# Patient Record
Sex: FEMALE
[De-identification: names, ages, dates, MRNs, and addresses within clinical notes are randomized per-mention and may not be internally consistent; named-entity substitution may affect disease eponyms.]

---

## 2017-04-17 ENCOUNTER — APPOINTMENT (OUTPATIENT)
Dept: ORTHOPEDIC SURGERY | Facility: CLINIC | Age: 42
End: 2017-04-17

## 2017-04-17 VITALS — HEIGHT: 63 IN | BODY MASS INDEX: 31.01 KG/M2 | WEIGHT: 175 LBS

## 2017-04-17 VITALS — DIASTOLIC BLOOD PRESSURE: 82 MMHG | HEART RATE: 74 BPM | SYSTOLIC BLOOD PRESSURE: 126 MMHG | TEMPERATURE: 98.9 F

## 2017-04-17 DIAGNOSIS — Z87.09 PERSONAL HISTORY OF OTHER DISEASES OF THE RESPIRATORY SYSTEM: ICD-10-CM

## 2017-04-17 DIAGNOSIS — M25.561 PAIN IN RIGHT KNEE: ICD-10-CM

## 2017-04-17 DIAGNOSIS — Z87.891 PERSONAL HISTORY OF NICOTINE DEPENDENCE: ICD-10-CM

## 2017-04-17 DIAGNOSIS — Z82.62 FAMILY HISTORY OF OSTEOPOROSIS: ICD-10-CM

## 2017-04-17 DIAGNOSIS — Z87.39 PERSONAL HISTORY OF OTHER DISEASES OF THE MUSCULOSKELETAL SYSTEM AND CONNECTIVE TISSUE: ICD-10-CM

## 2017-04-17 DIAGNOSIS — M25.562 PAIN IN RIGHT KNEE: ICD-10-CM

## 2017-06-07 ENCOUNTER — OTHER (OUTPATIENT)
Age: 42
End: 2017-06-07

## 2017-06-07 DIAGNOSIS — M25.561 PAIN IN RIGHT KNEE: ICD-10-CM

## 2017-06-16 ENCOUNTER — OTHER (OUTPATIENT)
Age: 42
End: 2017-06-16

## 2017-06-16 ENCOUNTER — APPOINTMENT (OUTPATIENT)
Dept: MRI IMAGING | Facility: CLINIC | Age: 42
End: 2017-06-16

## 2017-06-16 ENCOUNTER — OUTPATIENT (OUTPATIENT)
Dept: OUTPATIENT SERVICES | Facility: HOSPITAL | Age: 42
LOS: 1 days | End: 2017-06-16

## 2017-06-16 DIAGNOSIS — M25.561 PAIN IN RIGHT KNEE: ICD-10-CM

## 2017-06-23 ENCOUNTER — OUTPATIENT (OUTPATIENT)
Dept: OUTPATIENT SERVICES | Facility: HOSPITAL | Age: 42
LOS: 1 days | End: 2017-06-23

## 2017-06-23 ENCOUNTER — APPOINTMENT (OUTPATIENT)
Dept: MRI IMAGING | Facility: CLINIC | Age: 42
End: 2017-06-23

## 2017-06-23 DIAGNOSIS — Z00.8 ENCOUNTER FOR OTHER GENERAL EXAMINATION: ICD-10-CM

## 2017-07-14 ENCOUNTER — APPOINTMENT (OUTPATIENT)
Dept: ORTHOPEDIC SURGERY | Facility: CLINIC | Age: 42
End: 2017-07-14

## 2017-07-14 VITALS
HEIGHT: 63 IN | SYSTOLIC BLOOD PRESSURE: 121 MMHG | WEIGHT: 175 LBS | TEMPERATURE: 97.9 F | BODY MASS INDEX: 31.01 KG/M2 | HEART RATE: 96 BPM | DIASTOLIC BLOOD PRESSURE: 87 MMHG

## 2017-07-14 RX ORDER — MELOXICAM 15 MG/1
15 TABLET ORAL DAILY
Qty: 30 | Refills: 1 | Status: ACTIVE | COMMUNITY
Start: 2017-07-14 | End: 1900-01-01

## 2017-09-06 ENCOUNTER — OTHER (OUTPATIENT)
Age: 42
End: 2017-09-06

## 2017-12-28 ENCOUNTER — OTHER (OUTPATIENT)
Age: 42
End: 2017-12-28

## 2018-08-13 ENCOUNTER — APPOINTMENT (OUTPATIENT)
Dept: ORTHOPEDIC SURGERY | Facility: CLINIC | Age: 43
End: 2018-08-13
Payer: COMMERCIAL

## 2018-08-13 VITALS
BODY MASS INDEX: 31.89 KG/M2 | DIASTOLIC BLOOD PRESSURE: 79 MMHG | TEMPERATURE: 99 F | SYSTOLIC BLOOD PRESSURE: 131 MMHG | WEIGHT: 180 LBS | HEART RATE: 77 BPM | HEIGHT: 63 IN

## 2018-08-13 PROCEDURE — 99213 OFFICE O/P EST LOW 20 MIN: CPT

## 2019-04-17 ENCOUNTER — APPOINTMENT (OUTPATIENT)
Dept: ORTHOPEDIC SURGERY | Facility: CLINIC | Age: 44
End: 2019-04-17
Payer: COMMERCIAL

## 2019-04-17 VITALS
HEIGHT: 63 IN | DIASTOLIC BLOOD PRESSURE: 88 MMHG | HEART RATE: 82 BPM | BODY MASS INDEX: 31.89 KG/M2 | SYSTOLIC BLOOD PRESSURE: 122 MMHG | WEIGHT: 180 LBS

## 2019-04-17 DIAGNOSIS — M22.41 CHONDROMALACIA PATELLAE, RIGHT KNEE: ICD-10-CM

## 2019-04-17 DIAGNOSIS — M22.42 CHONDROMALACIA PATELLAE, RIGHT KNEE: ICD-10-CM

## 2019-04-17 DIAGNOSIS — S83.282D OTHER TEAR OF LATERAL MENISCUS, CURRENT INJURY, LEFT KNEE, SUBSEQUENT ENCOUNTER: ICD-10-CM

## 2019-04-17 PROCEDURE — 99214 OFFICE O/P EST MOD 30 MIN: CPT

## 2019-04-17 NOTE — DISCUSSION/SUMMARY
[Surgical risks reviewed] : Surgical risks reviewed [de-identified] : 43 year old female with patella chondromalacia bilaterally. The patient does have early degenerative medial and lateral meniscus tears in the left knee.\par \par At this point I encouraged her continue with conservative treatment, including physical therapy and activity modification. I provided her with a prescription for outpatient physical therapy. She can also continue with her gym exercises as tolerated. \par \par Given her history of left knee meniscal tear and pain that has persisted despite NSAIDs and physical therapy, I ordered an updated MRI of the left knee to evaluate internal derangement, rule-out further meniscus tearing\par \par The patient will follow-up to discuss MRI results when they are available. \par \par The percentages of success in an arthroscopy that involves a torn meniscus and arthritic changes is dependent upon how bad the arthritic changes are. Basically, removing a meniscal tear allows us to ascertain how bad the patient's articular cartilage destruction (arthritis) is. The arthroscopy cleans out any debris from the arthritic process as well as removing the meniscal tear. Approximately 75% of the patients will say that they feel relief, although their x-rays will continue to show significant arthritic changes. Arthroscopy for arthritis is a temporizing procedure, yielding subjective success (patient satisfaction) for less than two to five years. In some cases, the knee might eventually require a knee replacement for symptomatic relief. The prognostic factors that are somewhat favorable predictive values in arthroscopic debridements (removal of loose articular cartilage, loose body and inflamed synovium) of an arthritic knee are: short duration of symptoms, effusion (swelling), minimal deformity and good range of motion. The complications with any arthroscopy include the risk of anaesthetic complications and death, blood clots and pulmonary embolus, infection (less than 1%), nerve damage, by which we would mean a peroneal palsy (less than 0.1%) (small area of skin numbness is so common, we do not consider its presence a complication), injury to the popliteal artery, which is so rare that there are no statistics, but should it occur could theoretically lead to amputation, which is extremely unlikely. There is often a chance of getting a hemarthrosis (blood in the joint) but this usually resolves with local measures of icing, physical therapy, and aspiration. Reflex sympathetic dystrophy (RSD) is another extremely rare but theoretical complication. This (RSD) means that the patient has a stiff painful joint that is out of proportion to the objective pathology of the knee. Subsequently, it might require years of physical therapy before one regains a functional knee with RSD. Infrapatellar contracture syndrome (stiff joint) is sometimes reported and associated with RSD, but it usually is a result of not being aggressive in physical therapy. I think the patient understands the risk benefit ratio of arthroscopy and will think about whether they would prefer the nonoperative or surgical treatment option.\par  \par

## 2019-04-17 NOTE — HISTORY OF PRESENT ILLNESS
[Standing] : standing [NSAIDs] : relieved by nonsteroidal anti-inflammatory drugs [Intermit.] : ~He/She~ states the symptoms seem to be intermittent [Recumbency] : relieved by recumbency [Rest] : relieved by rest [4] : a current pain level of 4/10 [2] : a minimum pain level of 2/10 [Knee Flexion] : worsened with knee flexion [5] : a maximum pain level of 5/10 [Worsening] : worsening [___ yrs] : [unfilled] year(s) ago [Bending] : worsened by bending [Walking] : worsened by walking [de-identified] : 43 year old female presents with a diagnosis of early patellofemoral osteoarthritis of both knees, left knee worse than right. She does have a degenerative horizontal meniscus tear in the left knee of both the medial and lateral menisci. Her pain has persisted for about 1 year. She notes a recent had flare up of knee pain after using step ladder. Her pain has been worsening. She has attempted physical therapy in the past with good relief, however she stopped due to insurance issues. Her pain has also persisted following physical therapy. She does have persistent pain localized to the anterior aspect of her knees. She has been taking occasional anti-inflammatories, with mild relief. \par She is also struggling with plantar fasciitis and she has new orthotics which she feels may be worsening her knee pain. \par

## 2019-04-17 NOTE — PHYSICAL EXAM
[Normal] : Gait: normal [LE] : Sensory: Intact in bilateral lower extremities [ALL] : dorsalis pedis, posterior tibial, femoral, popliteal, and radial 2+ and symmetric bilaterally [de-identified] : GENERAL APPEARANCE: Well nourished and hydrated, pleasant, alert, and oriented x 3. Appears their stated age. \par HEENT: Normocephalic, extraocular eye motion intact. Nasal septum midline. Oral cavity clear. External auditory canal clear. \par RESPIRATORY: Breath sounds clear and audible in all lobes. No wheezing, No accessory muscle use.\par CARDIOVASCULAR: No apparent abnormalities. No lower leg edema. No varicosities. Pedal pulses are palpable.\par NEUROLOGIC: Sensation is normal, no muscle weakness in the upper or lower extremities.\par DERMATOLOGIC: No apparent skin lesions, moist, warm, no rash.\par SPINE: Cervical spine appears normal and moves freely; thoracic spine appears normal and moves freely; lumbosacral spine appears normal and moves freely, normal, nontender.\par MUSCULOSKELETAL: Hands, wrists, and elbows are normal and move freely, shoulders are normal and move freely.\par  [de-identified] : Both knees were examined full range of motion no effusion she does have moderate patellofemoral crepitus bilaterally, left knee mild medial and lateral jointline tenderness.\par Bilateral hip exam shows painless FROM.\par

## 2019-04-17 NOTE — ADDENDUM
[FreeTextEntry1] : I, Keith Orlando, acted solely as a scribe for Dr. Herb Blanco on this date 04/17/2019.

## 2019-04-22 ENCOUNTER — FORM ENCOUNTER (OUTPATIENT)
Age: 44
End: 2019-04-22

## 2019-04-23 ENCOUNTER — OUTPATIENT (OUTPATIENT)
Dept: OUTPATIENT SERVICES | Facility: HOSPITAL | Age: 44
LOS: 1 days | End: 2019-04-23

## 2019-04-23 ENCOUNTER — APPOINTMENT (OUTPATIENT)
Dept: MRI IMAGING | Facility: CLINIC | Age: 44
End: 2019-04-23
Payer: COMMERCIAL

## 2019-04-23 DIAGNOSIS — M22.41 CHONDROMALACIA PATELLAE, RIGHT KNEE: ICD-10-CM

## 2019-04-23 PROCEDURE — 73721 MRI JNT OF LWR EXTRE W/O DYE: CPT | Mod: 26,LT

## 2019-08-28 ENCOUNTER — APPOINTMENT (OUTPATIENT)
Dept: ORTHOPEDIC SURGERY | Facility: CLINIC | Age: 44
End: 2019-08-28
Payer: COMMERCIAL

## 2019-08-28 VITALS
HEART RATE: 80 BPM | SYSTOLIC BLOOD PRESSURE: 131 MMHG | BODY MASS INDEX: 30.12 KG/M2 | DIASTOLIC BLOOD PRESSURE: 63 MMHG | WEIGHT: 170 LBS | HEIGHT: 63 IN

## 2019-08-28 DIAGNOSIS — M17.0 BILATERAL PRIMARY OSTEOARTHRITIS OF KNEE: ICD-10-CM

## 2019-08-28 DIAGNOSIS — S83.242D OTHER TEAR OF MEDIAL MENISCUS, CURRENT INJURY, LEFT KNEE, SUBSEQUENT ENCOUNTER: ICD-10-CM

## 2019-08-28 PROCEDURE — 99213 OFFICE O/P EST LOW 20 MIN: CPT

## 2019-08-28 NOTE — PHYSICAL EXAM
[Normal] : Gait: normal [LE] : 5/5 motor strength in bilateral lower extremities [ALL] : dorsalis pedis, posterior tibial, femoral, popliteal, and radial 2+ and symmetric bilaterally [Antalgic] : not antalgic [de-identified] : GENERAL APPEARANCE: Well nourished and hydrated, pleasant, alert, and oriented x 3. Appears their stated age. \par HEENT: Normocephalic, extraocular eye motion intact. Nasal septum midline. Oral cavity clear. External auditory canal clear. \par RESPIRATORY: Breath sounds clear and audible in all lobes. No wheezing, No accessory muscle use.\par CARDIOVASCULAR: No apparent abnormalities. No lower leg edema. No varicosities. Pedal pulses are palpable.\par NEUROLOGIC: Sensation is normal, no muscle weakness in the upper or lower extremities.\par DERMATOLOGIC: No apparent skin lesions, moist, warm, no rash.\par SPINE: Cervical spine appears normal and moves freely; thoracic spine appears normal and moves freely; lumbosacral spine appears normal and moves freely, normal, nontender.\par MUSCULOSKELETAL: Hands, wrists, and elbows are normal and move freely, shoulders are normal and move freely.\par  [de-identified] : Both knees were examined and she has full range of motion, no effusion, moderate patellofemoral crepitus bilaterally, left knee mild medial jointline tenderness.\par Bilateral hip exam shows painless FROM.\par  [de-identified] : MRI of the left knee obtained 4/23/2019 at [facility] shows moderate patellofemoral arthrosis as on prior study. Slight worsening of horizontal cleavage tear involving the boy and anterior horn of the medial meniscus with an adjacent new parameniscal cyst. High origin of the anterior tibial artery.

## 2019-08-28 NOTE — HISTORY OF PRESENT ILLNESS
[___ yrs] : [unfilled] year(s) ago [4] : a current pain level of 4/10 [2] : an average pain level of 2/10 [5] : a maximum pain level of 5/10 [Standing] : standing [Intermit.] : ~He/She~ states the symptoms seem to be intermittent [Bending] : worsened by bending [Walking] : worsened by walking [Knee Flexion] : worsened with knee flexion [NSAIDs] : relieved by nonsteroidal anti-inflammatory drugs [Recumbency] : relieved by recumbency [Rest] : relieved by rest [Stable] : stable [Physical Therapy] : relieved by physical therapy [de-identified] : 43 year old female here for evaluation of bilateral knee pain, left worse than right. She has hx of PF OA. Patient was sent for PT and updated MRI at last visit. She states she is in PT currently.  She reports feeling relief with PT. She did have one episode of popping sensation with pain during squatting motion. She reports pain is localized medially. Pain is worse with bending motions and ambulating.\par

## 2019-08-28 NOTE — DISCUSSION/SUMMARY
[Surgical risks reviewed] : Surgical risks reviewed [de-identified] : 43 year old female with mild patellofemoral osteoarthritis of the bilateral knees and medial meniscus tear of the left knee. I reviewed MRI results with the patient which confirmed left knee patellofemoral OA and showed worsening of her medial meniscus tear. She is a candidate for left knee arthroscopy, but I recommended that she continue with non-operative treatment at this time and she agrees. We did discuss the role of knee arthroscopy should she decide to pursue surgery in the future. She has responded well to physical therapy, and I provided her with a new prescription for outpatient PT for her bilateral knees. Activity modifications and restrictions were discussed. The importance and benefits of weight loss was discussed. She deferred knee cortisone injection today. F/U 3 months. \par \par The percentages of success in an arthroscopy that involves a torn meniscus and arthritic changes is dependent upon how bad the arthritic changes are. Basically, removing a meniscal tear allows us to ascertain how bad the patient's articular cartilage destruction (arthritis) is. The arthroscopy cleans out any debris from the arthritic process as well as removing the meniscal tear. Approximately 75% of the patients will say that they feel relief, although their x-rays will continue to show significant arthritic changes. Arthroscopy for arthritis is a temporizing procedure, yielding subjective success (patient satisfaction) for less than two to five years. In some cases, the knee might eventually require a knee replacement for symptomatic relief. The prognostic factors that are somewhat favorable predictive values in arthroscopic debridements (removal of loose articular cartilage, loose body and inflamed synovium) of an arthritic knee are: short duration of symptoms, effusion (swelling), minimal deformity and good range of motion. The complications with any arthroscopy include the risk of anaesthetic complications and death, blood clots and pulmonary embolus, infection (less than 1%), nerve damage, by which we would mean a peroneal palsy (less than 0.1%) (small area of skin numbness is so common, we do not consider its presence a complication), injury to the popliteal artery, which is so rare that there are no statistics, but should it occur could theoretically lead to amputation, which is extremely unlikely. There is often a chance of getting a hemarthrosis (blood in the joint) but this usually resolves with local measures of icing, physical therapy, and aspiration. Reflex sympathetic dystrophy (RSD) is another extremely rare but theoretical complication. This (RSD) means that the patient has a stiff painful joint that is out of proportion to the objective pathology of the knee. Subsequently, it might require years of physical therapy before one regains a functional knee with RSD. Infrapatellar contracture syndrome (stiff joint) is sometimes reported and associated with RSD, but it usually is a result of not being aggressive in physical therapy. I think the patient understands the risk benefit ratio of arthroscopy and will think about whether they would prefer the nonoperative or surgical treatment option. \par \par \par  \par  \par

## 2019-08-28 NOTE — ADDENDUM
[FreeTextEntry1] : I, Keith Orlando, acted solely as a scribe for Dr. Herb Blanco on this date 08/28/2019.

## 2023-06-13 ENCOUNTER — APPOINTMENT (OUTPATIENT)
Dept: ORTHOPEDIC SURGERY | Facility: CLINIC | Age: 48
End: 2023-06-13
Payer: COMMERCIAL

## 2023-06-13 VITALS
WEIGHT: 165 LBS | HEIGHT: 63 IN | OXYGEN SATURATION: 100 % | SYSTOLIC BLOOD PRESSURE: 116 MMHG | DIASTOLIC BLOOD PRESSURE: 80 MMHG | BODY MASS INDEX: 29.23 KG/M2 | HEART RATE: 76 BPM

## 2023-06-13 PROCEDURE — 73564 X-RAY EXAM KNEE 4 OR MORE: CPT | Mod: 50

## 2023-06-13 PROCEDURE — 99204 OFFICE O/P NEW MOD 45 MIN: CPT

## 2023-06-13 RX ORDER — MELOXICAM 7.5 MG/1
7.5 TABLET ORAL
Qty: 30 | Refills: 0 | Status: ACTIVE | COMMUNITY
Start: 2023-06-13 | End: 1900-01-01

## 2023-06-13 NOTE — HISTORY OF PRESENT ILLNESS
[de-identified] : Patient is a 47-year-old female presenting for evaluation of bilateral knee pain equal intensity.  She notes the knee pain is generalized mostly anteriorly and medially.  Her pain is worse with stairs and with rising from a seated position.  She admits to intermittent crepitus.  Patient denies any falls or trauma.  In the past she was diagnosed with left knee medial meniscus tear.  This was treated conservatively years ago.  She has not had a recent MRI.  She has not had recent injections.  Patient tried therapy and anti-inflammatories without significant relief

## 2023-06-13 NOTE — DISCUSSION/SUMMARY
[de-identified] : SURAJ GARCIA is a 47 year old female who presents with bilateral knee early patellofemoral compartment arthritis. Nonoperative treatment options for knee arthritis were discussed. A prescription for physical therapy was provided. A course of Mobic was recommended. The patient was given a prescription for the Mobic with directions. She was instructed to stop the medicine and call the office if there are any adverse reaction to the medicine. The patient will follow up if her pain worsens at which point we will consider cortisone or gel injections in her knees.  We discussed viscosupplementation hyaluronic acid injections and their use but I do think she may ultimately benefit from.

## 2023-06-13 NOTE — PHYSICAL EXAM
[de-identified] : The patient appears well nourished  and in no apparent distress.  The patient is alert and oriented to person, place, and time.   Affect and mood appear normal. The head is normocephalic and atraumatic.  The eyes reveal normal sclera and extra ocular muscles are intact. The tongue is midline with no apparent lesions.  Skin shows normal turgor with no evidence of eczema or psoriasis.  No respiratory distress noted.  Sensation grossly intact.		  [de-identified] : Exam of the right knee shows 0 to 140 degrees of flexion measured with a goniometer. There is no effusion. There is patellofemoral crepitance without pain. There is no joint line tenderness. \par Exam of the left knee shows 0 to 140 degrees of flexion measured with a goniometer. There is no effusion. There is patellofemoral crepitance (more than in the right knee) without pain. There is no joint line tenderness. \par 5/5 motor strength bilaterally distally. Sensation intact distally.  [de-identified] : X-ray: 4 views of the left knee demonstrate well preserved joint spaces with small patellofemoral osteophytes. 		 \par X-ray: 4 views of the right knee demonstrate well preserved joint spaces with small patellofemoral osteophytes.

## 2023-06-13 NOTE — ADDENDUM
[FreeTextEntry1] : This note was authored by Antonio Youngblood working as a medical scribe for Dr. Mustapha Chang. The note was reviewed, edited, and revised by Dr. Mustapha Chang whom is in agreement with the exam findings, imaging findings, and treatment plan. 06/13/2023

## 2024-07-09 ENCOUNTER — INPATIENT (INPATIENT)
Facility: HOSPITAL | Age: 49
LOS: 2 days | Discharge: HOME | DRG: 103 | End: 2024-07-12
Attending: INTERNAL MEDICINE | Admitting: STUDENT IN AN ORGANIZED HEALTH CARE EDUCATION/TRAINING PROGRAM
Payer: COMMERCIAL

## 2024-07-09 VITALS
OXYGEN SATURATION: 97 % | RESPIRATION RATE: 20 BRPM | WEIGHT: 160.94 LBS | DIASTOLIC BLOOD PRESSURE: 77 MMHG | SYSTOLIC BLOOD PRESSURE: 128 MMHG | HEART RATE: 94 BPM | TEMPERATURE: 98 F

## 2024-07-09 LAB
ALBUMIN SERPL ELPH-MCNC: 3.7 G/DL — SIGNIFICANT CHANGE UP (ref 3.3–5.2)
ALP SERPL-CCNC: 58 U/L — SIGNIFICANT CHANGE UP (ref 40–120)
ALT FLD-CCNC: 19 U/L — SIGNIFICANT CHANGE UP
ANION GAP SERPL CALC-SCNC: 15 MMOL/L — SIGNIFICANT CHANGE UP (ref 5–17)
AST SERPL-CCNC: 29 U/L — SIGNIFICANT CHANGE UP
BASOPHILS # BLD AUTO: 0.04 K/UL — SIGNIFICANT CHANGE UP (ref 0–0.2)
BASOPHILS NFR BLD AUTO: 0.5 % — SIGNIFICANT CHANGE UP (ref 0–2)
BILIRUB SERPL-MCNC: 0.3 MG/DL — LOW (ref 0.4–2)
BUN SERPL-MCNC: 18.5 MG/DL — SIGNIFICANT CHANGE UP (ref 8–20)
CALCIUM SERPL-MCNC: 8.9 MG/DL — SIGNIFICANT CHANGE UP (ref 8.4–10.5)
CHLORIDE SERPL-SCNC: 99 MMOL/L — SIGNIFICANT CHANGE UP (ref 96–108)
CO2 SERPL-SCNC: 23 MMOL/L — SIGNIFICANT CHANGE UP (ref 22–29)
CREAT SERPL-MCNC: 0.67 MG/DL — SIGNIFICANT CHANGE UP (ref 0.5–1.3)
CRP SERPL-MCNC: <4 MG/L — SIGNIFICANT CHANGE UP
EGFR: 106 ML/MIN/1.73M2 — SIGNIFICANT CHANGE UP
EOSINOPHIL # BLD AUTO: 0.1 K/UL — SIGNIFICANT CHANGE UP (ref 0–0.5)
EOSINOPHIL NFR BLD AUTO: 1.2 % — SIGNIFICANT CHANGE UP (ref 0–6)
ERYTHROCYTE [SEDIMENTATION RATE] IN BLOOD: 5 MM/HR — SIGNIFICANT CHANGE UP (ref 0–20)
GLUCOSE SERPL-MCNC: 112 MG/DL — HIGH (ref 70–99)
HCT VFR BLD CALC: 42.3 % — SIGNIFICANT CHANGE UP (ref 34.5–45)
HGB BLD-MCNC: 14.2 G/DL — SIGNIFICANT CHANGE UP (ref 11.5–15.5)
IMM GRANULOCYTES NFR BLD AUTO: 0.2 % — SIGNIFICANT CHANGE UP (ref 0–0.9)
LYMPHOCYTES # BLD AUTO: 1.52 K/UL — SIGNIFICANT CHANGE UP (ref 1–3.3)
LYMPHOCYTES # BLD AUTO: 18.7 % — SIGNIFICANT CHANGE UP (ref 13–44)
MCHC RBC-ENTMCNC: 29.7 PG — SIGNIFICANT CHANGE UP (ref 27–34)
MCHC RBC-ENTMCNC: 33.6 GM/DL — SIGNIFICANT CHANGE UP (ref 32–36)
MCV RBC AUTO: 88.5 FL — SIGNIFICANT CHANGE UP (ref 80–100)
MONOCYTES # BLD AUTO: 0.87 K/UL — SIGNIFICANT CHANGE UP (ref 0–0.9)
MONOCYTES NFR BLD AUTO: 10.7 % — SIGNIFICANT CHANGE UP (ref 2–14)
NEUTROPHILS # BLD AUTO: 5.58 K/UL — SIGNIFICANT CHANGE UP (ref 1.8–7.4)
NEUTROPHILS NFR BLD AUTO: 68.7 % — SIGNIFICANT CHANGE UP (ref 43–77)
PLATELET # BLD AUTO: 204 K/UL — SIGNIFICANT CHANGE UP (ref 150–400)
POTASSIUM SERPL-MCNC: 4 MMOL/L — SIGNIFICANT CHANGE UP (ref 3.5–5.3)
POTASSIUM SERPL-SCNC: 4 MMOL/L — SIGNIFICANT CHANGE UP (ref 3.5–5.3)
PROT SERPL-MCNC: 6.7 G/DL — SIGNIFICANT CHANGE UP (ref 6.6–8.7)
RBC # BLD: 4.78 M/UL — SIGNIFICANT CHANGE UP (ref 3.8–5.2)
RBC # FLD: 12.1 % — SIGNIFICANT CHANGE UP (ref 10.3–14.5)
SODIUM SERPL-SCNC: 137 MMOL/L — SIGNIFICANT CHANGE UP (ref 135–145)
WBC # BLD: 8.13 K/UL — SIGNIFICANT CHANGE UP (ref 3.8–10.5)
WBC # FLD AUTO: 8.13 K/UL — SIGNIFICANT CHANGE UP (ref 3.8–10.5)

## 2024-07-09 PROCEDURE — 99285 EMERGENCY DEPT VISIT HI MDM: CPT

## 2024-07-09 RX ORDER — METOCLOPRAMIDE 5 MG/5ML
10 SOLUTION ORAL ONCE
Refills: 0 | Status: COMPLETED | OUTPATIENT
Start: 2024-07-09 | End: 2024-07-09

## 2024-07-09 RX ORDER — SODIUM CHLORIDE 0.9 % (FLUSH) 0.9 %
1000 SYRINGE (ML) INJECTION ONCE
Refills: 0 | Status: COMPLETED | OUTPATIENT
Start: 2024-07-09 | End: 2024-07-09

## 2024-07-09 RX ADMIN — METOCLOPRAMIDE 10 MILLIGRAM(S): 5 SOLUTION ORAL at 21:36

## 2024-07-09 RX ADMIN — Medication 1000 MILLILITER(S): at 21:36

## 2024-07-09 NOTE — ED ADULT NURSE NOTE - OBJECTIVE STATEMENT
Pt A&Ox4. Came in w/ c/o left eye pain & discomfort as well as associated headache since June 29th. Pt seen at opthalmologist today and sent in for further workup. Denies visual disturbances, chest pain, SOB, N/V, numbness, weakness, tingling. VS stable, RR even and unlabored, safety maintained.

## 2024-07-09 NOTE — ED PROVIDER NOTE - PHYSICAL EXAMINATION
Gen: Alert, NAD  Head: NC, AT, PERRL, EOMI, normal lids/conjunctiva. ttp @ left temporal forehead  Neck: +supple, no tenderness/meningismus/JVD, +Trachea midline  Pulm: Bilateral BS, normal resp effort, no wheeze/stridor/retractions  CV: RRR, no M/R/G, 2+dist pulses  Abd: soft, NT/ND, +BS, no hepatosplenomegaly  Mskel: ROM intact x4 extremities.  no edema/erythema/cyanosis  Neuro: AAOx3, no sensory/motor deficits, CN 2-12 intact

## 2024-07-09 NOTE — ED ADULT NURSE NOTE - CHPI ED NUR SYMPTOMS NEG
no blurred vision/no discharge/no double vision/no drainage/no eye lid swelling/no foreign body/no purulent drainage

## 2024-07-09 NOTE — ED PROVIDER NOTE - CLINICAL SUMMARY MEDICAL DECISION MAKING FREE TEXT BOX
48yoF; with PMH signif for ?Ocular Migraines; now p/w left eye pain--x3-4 days. pressure to eye, radiating to left sided headache. (FABIOLA Urrutia MD) Initial Assessment: 48yoF; with PMH signif for ?Ocular Migraines; now p/w left eye pain--x3-4 days. pressure to eye, radiating to left sided headache.  will do labs, ct/cta, re-eval      ACP to complete summary of medical encounter below.  Summary of Clinical Encounter: (FABIOLA Urrutia MD) Initial Assessment: 48yoF; with PMH signif for ?Ocular Migraines; now p/w left eye pain--x3-4 days. pressure to eye, radiating to left sided headache.  will do labs, ct/cta, re-eval      ACP to complete summary of medical encounter below.  Summary of Clinical Encounter:  [facility] : Labs unremarkable. CT/CTA no acute findings. HA somewhat improved however still present. Was sent in by ophtho concerned for MS advising MRI. Pt agreeable to stay on observation for MRI.

## 2024-07-09 NOTE — ED PROVIDER NOTE - NS ED ROS FT
Constitutional: (-) fever  (-)chills  (-)sweats  Eyes/ENT: +eye pain  Cardiovascular: (-) chest pain, (-) palpitations (-) edema   Respiratory: (-) cough, (-) shortness of breath   Gastrointestinal: (-)nausea  (-)vomiting, (-) diarrhea  (-) abdominal pain   :  (-)dysuria, (-)frequency, (-)urgency, (-)hematuria  Musculoskeletal: (-) neck pain, (-) back pain, (-) joint pain  Integumentary: (-) rash, (-) edema  Neurological: (+) headache, (-) altered mental status  (-)LOC

## 2024-07-09 NOTE — ED PROVIDER NOTE - OBJECTIVE STATEMENT
48yoF; with PMH signif for ?Ocular Migraines; now p/w left eye pain--x3-4 days. pressure to eye, radiating to left sided headache. denies blurry vision or double vision. denies numbness/tingling. denies clumsiness or unsteady gait. does report some decreased  strength in right hand x1 month.  denies cp/sob/palp. denies dizziness.   Family History: systemic vasculitis (mother), MS (grandmother)  PMH: denies  SOCIAL: denies smoking 48yoF; with PMH signif for ?Ocular Migraines; now p/w left eye pain--x3-4 days. pressure to eye, radiating to left sided headache. denies blurry vision or double vision. denies numbness/tingling. denies clumsiness or unsteady gait. does report some decreased  strength in right hand x1 month.  denies cp/sob/palp. denies dizziness.   patient went to Ophthalmologist today for evaluation, found to have normal fundoscopic exam, normal retinas, normal IOP, normal visual acuity. sent by ophtho with concern of MS.   Family History: systemic vasculitis (mother), MS (grandmother)  PMH: denies  SOCIAL: denies smoking

## 2024-07-10 DIAGNOSIS — R51.9 HEADACHE, UNSPECIFIED: ICD-10-CM

## 2024-07-10 LAB
APPEARANCE CSF: CLEAR — SIGNIFICANT CHANGE UP
B BURGDOR C6 AB SER-ACNC: NEGATIVE — SIGNIFICANT CHANGE UP
B BURGDOR IGG+IGM SER-ACNC: 0.37 INDEX — SIGNIFICANT CHANGE UP (ref 0.01–0.9)
C NEOFORM RRNA SPEC NAA+PROBE-ACNC: SIGNIFICANT CHANGE UP
CMV DNA CSF QL NAA+PROBE: SIGNIFICANT CHANGE UP
COLOR CSF: SIGNIFICANT CHANGE UP
CRYPTOC AG CSF-ACNC: NEGATIVE — SIGNIFICANT CHANGE UP
CSF PCR RESULT: SIGNIFICANT CHANGE UP
E COLI K1 DNA CSF QL NAA+NON-PROBE: SIGNIFICANT CHANGE UP
ESCHERICHIA COLI K1: SIGNIFICANT CHANGE UP
EV RNA CSF QL NAA+PROBE: SIGNIFICANT CHANGE UP
GLUCOSE CSF-MCNC: 63 MG/DLG/24H — SIGNIFICANT CHANGE UP (ref 40–70)
GLUCOSE SERPL-MCNC: 113 MG/DL — HIGH (ref 70–99)
GP B STREP DNA SPEC QL NAA+PROBE: SIGNIFICANT CHANGE UP
GRAM STN FLD: SIGNIFICANT CHANGE UP
GRAM STN FLD: SIGNIFICANT CHANGE UP
HAEM INFLU DNA SPEC QL NAA+PROBE: SIGNIFICANT CHANGE UP
HHV6 DNA CSF QL NAA+PROBE: SIGNIFICANT CHANGE UP
HSV1 DNA CSF QL NAA+PROBE: SIGNIFICANT CHANGE UP
HSV2 DNA CSF QL NAA+PROBE: SIGNIFICANT CHANGE UP
L MONOCYTOG DNA SPEC QL NAA+PROBE: SIGNIFICANT CHANGE UP
N MEN DNA SPEC QL NAA+PROBE: SIGNIFICANT CHANGE UP
NEUTROPHILS # CSF: SIGNIFICANT CHANGE UP % (ref 0–6)
NRBC NFR CSF: 1 /UL — SIGNIFICANT CHANGE UP (ref 0–5)
PARECHOVIRUS A RNA SPEC QL NAA+PROBE: SIGNIFICANT CHANGE UP
PROT CSF-MCNC: 27 MG/DL — SIGNIFICANT CHANGE UP (ref 15–45)
RBC # CSF: 0 /CMM — SIGNIFICANT CHANGE UP (ref 0–1)
S PNEUM DNA SPEC QL NAA+PROBE: SIGNIFICANT CHANGE UP
SPECIMEN SOURCE: SIGNIFICANT CHANGE UP
T PALLIDUM AB TITR SER: NEGATIVE — SIGNIFICANT CHANGE UP
TUBE TYPE: SIGNIFICANT CHANGE UP
VZV DNA CSF QL NAA+PROBE: SIGNIFICANT CHANGE UP

## 2024-07-10 PROCEDURE — 99223 1ST HOSP IP/OBS HIGH 75: CPT

## 2024-07-10 PROCEDURE — 70543 MRI ORBT/FAC/NCK W/O &W/DYE: CPT | Mod: 26,MC

## 2024-07-10 PROCEDURE — 70496 CT ANGIOGRAPHY HEAD: CPT | Mod: 26,MC

## 2024-07-10 PROCEDURE — 62270 DX LMBR SPI PNXR: CPT

## 2024-07-10 PROCEDURE — 70498 CT ANGIOGRAPHY NECK: CPT | Mod: 26,MC

## 2024-07-10 PROCEDURE — 70553 MRI BRAIN STEM W/O & W/DYE: CPT | Mod: 26,MC

## 2024-07-10 PROCEDURE — 99223 1ST HOSP IP/OBS HIGH 75: CPT | Mod: 25

## 2024-07-10 PROCEDURE — 99283 EMERGENCY DEPT VISIT LOW MDM: CPT

## 2024-07-10 RX ORDER — METHYLPREDNISOLONE ACETATE 20 MG/ML
125 VIAL (ML) INJECTION ONCE
Refills: 0 | Status: COMPLETED | OUTPATIENT
Start: 2024-07-10 | End: 2024-07-10

## 2024-07-10 RX ORDER — ACETAMINOPHEN 325 MG
650 TABLET ORAL EVERY 6 HOURS
Refills: 0 | Status: DISCONTINUED | OUTPATIENT
Start: 2024-07-10 | End: 2024-07-12

## 2024-07-10 RX ORDER — METHYLPREDNISOLONE ACETATE 20 MG/ML
1000 VIAL (ML) INJECTION DAILY
Refills: 0 | Status: COMPLETED | OUTPATIENT
Start: 2024-07-10 | End: 2024-07-12

## 2024-07-10 RX ORDER — MAGNESIUM, ALUMINUM HYDROXIDE 400-400
30 TABLET,CHEWABLE ORAL EVERY 4 HOURS
Refills: 0 | Status: DISCONTINUED | OUTPATIENT
Start: 2024-07-10 | End: 2024-07-12

## 2024-07-10 RX ORDER — DIAZEPAM 10 MG/1
5 TABLET ORAL ONCE
Refills: 0 | Status: DISCONTINUED | OUTPATIENT
Start: 2024-07-10 | End: 2024-07-10

## 2024-07-10 RX ADMIN — DIAZEPAM 5 MILLIGRAM(S): 10 TABLET ORAL at 10:19

## 2024-07-10 RX ADMIN — Medication 125 MILLIGRAM(S): at 08:50

## 2024-07-10 RX ADMIN — Medication 50 MILLIGRAM(S): at 13:10

## 2024-07-10 RX ADMIN — Medication 650 MILLIGRAM(S): at 18:23

## 2024-07-10 NOTE — ED CDU PROVIDER DISPOSITION NOTE - ATTENDING CONTRIBUTION TO CARE
pt with work up for optic neuritis; Lumbar puncture performed; as per neurology pt to be admitted for iv steroids , and monitoring.

## 2024-07-10 NOTE — ED CDU PROVIDER INITIAL DAY NOTE - CLINICAL SUMMARY MEDICAL DECISION MAKING FREE TEXT BOX
48yoF; with PMH signif for ?Ocular Migraines; now p/w left eye pain--x3-4 days. pressure to eye, radiating to left sided headache. saw ophtho with normal IOP/fundoscopic exam and sent in for further work-up /possible optic neuritis. Labs unremarkable. CT/CTA no acute findings. HA somewhat improved however still present. Placed on obs for MRI

## 2024-07-10 NOTE — H&P ADULT - HISTORY OF PRESENT ILLNESS
"Request for medication refill:    Requesting BD syr/ndl 3ml 23Gx1 1-2, not on current med list    Providers if patient needs an appointment and you are willing to give a one month supply please refill for one month and  send a letter/MyChart using \".SMILLIMITEDREFILL\" .smillimited and route chart to \"P SMI \" (Giving one month refill in non controlled medications is strongly recommended before denial)    If refill has been denied, meaning absolutely no refills without visit, please complete the smart phrase \".smirxrefuse\" and route it to the \"P SMI MED REFILLS\"  pool to inform the patient and the pharmacy.    Emma Curtis, CMA        "
49 y/o F w/ PMH of occular migraines presents c/o L-eye pain x 4 days. Pt states the pain is pressure like initially and was intermittent but for the past 2 days its constant and is hurts more w/ eye movement.  Initially hurt when looking up to the left and right then progressed to pain w/ all eye movements.  She also had an associated frontal HA yesterday.  She went to ophthalmologist today and was found to have a normal fundoscopic exam, normal IOP, normal VA but was sent in to ED for concern for MS.  Pt denies vision loss or vision changes.  She has no trauma to eye.  She denies unsteady gait, incontinence dizziness, near sycnope. Pt does make note of her mother having vasculitis and her grandmother having MS.

## 2024-07-10 NOTE — PATIENT PROFILE ADULT - FALL HARM RISK - UNIVERSAL INTERVENTIONS
Bed in lowest position, wheels locked, appropriate side rails in place/Call bell, personal items and telephone in reach/Instruct patient to call for assistance before getting out of bed or chair/Non-slip footwear when patient is out of bed/[place] to call system/Physically safe environment - no spills, clutter or unnecessary equipment/Purposeful Proactive Rounding/Room/bathroom lighting operational, light cord in reach

## 2024-07-10 NOTE — H&P ADULT - NSHPLABSRESULTS_GEN_ALL_CORE
14.2   8.13  )-----------( 204      ( 09 Jul 2024 21:17 )             42.3         07-09    137  |  99  |  18.5  ----------------------------<  112<H>  4.0   |  23.0  |  0.67    Ca    8.9      09 Jul 2024 21:17    TPro  6.7  /  Alb  3.7  /  TBili  0.3<L>  /  DBili  x   /  AST  29  /  ALT  19  /  AlkPhos  58  07-09        < from: MR Orbits w/wo IV Cont (07.10.24 @ 07:27) >    IMPRESSION:    MRI ORBITS:  1.  Mild enhancement with faint corresponding increase in T2 signal of   the intraorbital segment of the left optic nerve, likely compatible with   a optic neuritis. A small optic nerve glioma is not entirely excluded but   felt less much less likely. Recommend follow-up imaging.    MRI BRAIN:  1.  No evidence of acute infarct or significant midline shift.  2.  Few foci of increased T2/FLAIR signal throughout the subcortical and   deep white matter. This finding is nonspecific and has been seen in the   setting of chronic small vessel disease, demyelination, sequela of   chronic migraine headaches, or other infectious/inflammatory processes.    --- End of Report ---    < end of copied text >    < from: CT Angio Head w/ IV Cont (07.10.24 @ 01:23) >    IMPRESSION:    CT BRAIN:  No acute intracranial hemorrhage, mass effect, or CT evidence   of an acute or recent subacute transcortical infarct.    CTA NECK:  No significant stenosis of the cervical carotid or vertebral   arteries.    CTA HEAD:  No significant stenosis or occlusion of the major proximal   branches of the [place].    --- End of Report ---      < end of copied text >

## 2024-07-10 NOTE — ED ADULT NURSE REASSESSMENT NOTE - NIH STROKE SCALE: 1B. LOC QUESTIONS, QM
I have reviewed and concur with Dr. Javed's history, physical, assessment, and plan.  I have personally interviewed and examined the patient.  See below addendum for my evaluation and additional findings.    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  Prior to surgery he had no symptoms of Cushing's syndrome.  Patient was ill with coronavirus so was unable to get ordered blood work done including low-dose dexamethasone suppression test.  He has been having fatigue, temperature intolerance, hair loss, low libido.  Symptoms are nonspecific but will check pituitary log as to rule out adrenal insufficiency (unlikely given duration of symptoms), thyroid dysfunction, hypogonadism.  Given that he had positive ACTH on pathology, in the future will have him do the low-dose dexamethasone suppression test.  He is already scheduled for repeat MRI and follow-up with Dr. Argueta to review the results.  I will see him virtually in 3 months, I will order labs based on these results for the future.    Lazarus Franz MD       
(0) Answers both questions correctly
(0) Answers both questions correctly

## 2024-07-10 NOTE — CONSULT NOTE ADULT - ASSESSMENT
The patient is a 48y Female with left optic neuritis.     Optic neuritis.   MRI findings consistent with optic neuritis.   Brain MRI findings are non specific and do not appear typical of multiple sclerosis.   Agree that LP was indicated to assess CSF for inflammation, OCB, and MBP.   Will begin IV steroid x 3 days.     Case discussed with ER team Dr Gil attending, and with Dr Finley (admitting hospitalist).

## 2024-07-10 NOTE — H&P ADULT - ASSESSMENT
47 y/o F w/ PMH of occular migraines presents c/o L-eye pain x 4 days. Pt states the pain is pressure like initially and was intermittent but for the past 2 days its constant and is hurts more w/ eye movement.  Initially hurt when looking up to the left and right then progressed to pain w/ all eye movements.  She also had an associated frontal HA yesterday.  She went to ophthalmologist today and was found to have a normal fundoscopic exam, normal IOP, normal VA but was sent in to ED for concern for MS.  Pt denies vision loss or vision changes.  She has no trauma to eye.  She denies unsteady gait, incontinence dizziness, near sycnope.  ESR/CRP negative.  CTH and CTA h/n negative.  MRI brain w/ nonspecific findings.  MRI orbit w/ findings suggestive of L-optic neuritis.  Pt was given 125mg IV solumedrol.  LP performed  Neurology consulted.  Pt will be admitted for optic neuritis.        L eye optic neuritis   - MRI brain w/ nonspecific findings that can be seen w/ chronic small vessel dx, demyelinating dx, sequela of chronic migraines or infectious/inflammatory process   - MRI orbits w/ findings suggestive of L-optic neuritis   - CTH and CTA h/n negative for acute pathology   - ESR 5 and CRP<4 making vasculitis less likely   - Clinically no signs of infection, is without meningeal signs, wbc normal and afebrile   - s/p 125mg IV solumedrol in ED   - s/p LP and CSF sent to assess for inflammation, infection, OCB, and MBP  - Start on 1g IV solumedrol x3 days   - Neurology following and recs noted        VTE ppx: ambulatory     Dispo: Acute.  d/c in 3rd day of pulse steroid regimen if medically stable.

## 2024-07-10 NOTE — H&P ADULT - NSHPPHYSICALEXAM_GEN_ALL_CORE
GENERAL: pt examined bedside, laying comfortably in bed in NAD  HEENT: NC/AT, moist oral mucosa, clear conjunctiva, sclera nonicteric  RESPIRATORY: Normal respiratory effort, no wheezing, rhonchi, rales  CARDIOVASCULAR: RRR, normal S1 and S2, no murmur/rub/gallop  ABDOMEN: soft, NT/ND, normoactive bowel sounds, no rebound/guarding  MSK: No joint deformities, edema, erythema  EXTREMITIES: No cynaosis, no clubbing, no lower extremity edema  PSYCH: affect appropriate and cooperative  NEUROLOGY: A+O to person, place, and time, no focal neurologic deficits appreciated  SKIN: No rashes or no palpable lesions

## 2024-07-10 NOTE — ED CDU PROVIDER DISPOSITION NOTE - CLINICAL COURSE
Patient with eye pain, seen by outside optho concern for MS, MRI +optic neuritis, consulted neurology advises LP.  Pending results.  Case to be discussed with hospitalist.

## 2024-07-10 NOTE — ED ADULT NURSE REASSESSMENT NOTE - NS ED NURSE REASSESS COMMENT FT1
received report from Azar taylor and assumed care of pt. pt currently at mri. will assess upon return.
pt. axo3 with equal and unlabored resp placed under obs for MRI in am, no signs of distress noted at this time.
pt. axo3 with equal and unlabored resp showing no signs of distress after receiving report from day RN.
sitting calm in bed. a and o x3. breathing even and unlabored. no complaints at this time. pt updated on poc.
received pt back from mri. sitting calm in bed. a and o x3. breathing even and unlabored. on observation and awaiting mri results.

## 2024-07-10 NOTE — PATIENT PROFILE ADULT - INTERNATIONAL TRAVEL
Prior auth for Tacey Polite was denied by insurance. Patient needs to try and fail invokana, jardiance. Recommendations? No

## 2024-07-10 NOTE — ED CDU PROVIDER INITIAL DAY NOTE - OBJECTIVE STATEMENT
48yoF; with PMH signif for ?Ocular Migraines; now p/w left eye pain--x3-4 days. pressure to eye, radiating to left sided headache. denies blurry vision or double vision. denies numbness/tingling. denies clumsiness or unsteady gait. does report some decreased  strength in right hand x1 month.  denies cp/sob/palp. denies dizziness.   patient went to Ophthalmologist today for evaluation, found to have normal fundoscopic exam, normal retinas, normal IOP, normal visual acuity. sent by ophtho with concern of MS.   Family History: systemic vasculitis (mother), MS (grandmother)  PMH: denies  SOCIAL: denies smoking

## 2024-07-10 NOTE — CONSULT NOTE ADULT - SUBJECTIVE AND OBJECTIVE BOX
[facility]                                        Neurology at [place]                                  Jenn Barrow, & Benjamin                                      [address]                                           [place], [place], [address]                                                [phone]        CC: Optic neuritis    HISTORY:  The patient is a 48y Female who presents from her eye doctor's office. She has had left eye pain for the past several days. She ultimately went for evaluation and there was concern for optic neuritis and she was referred to the ER.     PAST MEDICAL & SURGICAL HISTORY:  None.    MEDICATION PRIOR TO ADMISSION:  None.    MEDICATIONS  (PRN):  acetaminophen     Tablet .. 650 milliGRAM(s) Oral every 6 hours PRN Mild Pain (1 - 3)    Allergies  No Known Allergies    SOCIAL HISTORY:  Non smoker.     FAMILY HISTORY:  Maternal grandmother with multiple sclerosis.    ROS:  Constitutional: The patient denies fevers or weight changes.  Neuro: As per HPI.  Eyes: Denies blurry vision.  Ears/nose/throat: Denies Tinnitus.   Cardiac: Denies chest pain. Denies palpitations.  Respiratory: Denies shortness of breath.  GI: Denies abdominal pain, nausea, or vomiting.  : Denies change in urinary pattern.  Integumentary: Denies rash.  Psych: Denies recent mood changes.  Heme: denies easy bleeding/bruising.    Exam:  Vital Signs Last 24 Hrs  T(C): 37.1 (10 Jul 2024 07:50), Max: 37.2 (09 Jul 2024 23:30)  T(F): 98.7 (10 Jul 2024 07:50), Max: 98.9 (09 Jul 2024 23:30)  HR: 68 (10 Jul 2024 07:50) (66 - 94)  BP: 121/82 (10 Jul 2024 07:50) (110/75 - 128/77)  RR: 16 (10 Jul 2024 07:50) (16 - 20)  SpO2: 97% (10 Jul 2024 07:50) (97% - 98%)    Parameters below as of 10 Jul 2024 07:50  Patient On (Oxygen Delivery Method): room air    General: NAD.   Carotid bruits absent.     Mental status: The patient is awake, alert, and fully oriented. There is no aphasia. Attention span is normal. Patient is aware of current events.     Cranial nerves: There is no papilledema. Pupils react symmetrically to light. There is no visual field deficit to confrontation. Extraocular motion is full with no nystagmus.  Facial sensation is intact. Facial musculature is symmetric. Palate elevates symmetrically. Tongue is midline.    Motor: There is normal bulk and tone.  Strength is 5/5 in the right arm and leg.   Strength is 5/5 in the left arm and leg.    Sensation: Intact to light touch and pin. There is no extinction to double simultaneous stimulation.    Reflexes: 2+ throughout and plantar responses are flexor.    Cerebellar: There is no dysmetria on finger to nose testing.    LABS:                         14.2   8.13  )-----------( 204      ( 09 Jul 2024 21:17 )             42.3       07-09    137  |  99  |  18.5  ----------------------------<  112<H>  4.0   |  23.0  |  0.67    Ca    8.9      09 Jul 2024 21:17    TPro  6.7  /  Alb  3.7  /  TBili  0.3<L>  /  DBili  x   /  AST  29  /  ALT  19  /  AlkPhos  58  07-09    RADIOLOGY   MRI brain and orbits images reviewed.   There is mild enhancement with faint corresponding increase in T2 signal of the intraorbital segment of the left optic nerve.  There are a few nonspecific  foci of increased T2/FLAIR signal throughout the subcortical and deep white matter.

## 2024-07-11 ENCOUNTER — TRANSCRIPTION ENCOUNTER (OUTPATIENT)
Age: 49
End: 2024-07-11

## 2024-07-11 LAB
ANION GAP SERPL CALC-SCNC: 15 MMOL/L — SIGNIFICANT CHANGE UP (ref 5–17)
AUTO DIFF PNL BLD: ABNORMAL
BUN SERPL-MCNC: 18 MG/DL — SIGNIFICANT CHANGE UP (ref 8–20)
C-ANCA SER-ACNC: NEGATIVE — SIGNIFICANT CHANGE UP
CALCIUM SERPL-MCNC: 8.9 MG/DL — SIGNIFICANT CHANGE UP (ref 8.4–10.5)
CHLORIDE SERPL-SCNC: 103 MMOL/L — SIGNIFICANT CHANGE UP (ref 96–108)
CO2 SERPL-SCNC: 20 MMOL/L — LOW (ref 22–29)
CREAT SERPL-MCNC: 0.58 MG/DL — SIGNIFICANT CHANGE UP (ref 0.5–1.3)
EGFR: 110 ML/MIN/1.73M2 — SIGNIFICANT CHANGE UP
GLUCOSE SERPL-MCNC: 156 MG/DL — HIGH (ref 70–99)
HCT VFR BLD CALC: 40.7 % — SIGNIFICANT CHANGE UP (ref 34.5–45)
HGB BLD-MCNC: 13.3 G/DL — SIGNIFICANT CHANGE UP (ref 11.5–15.5)
MCHC RBC-ENTMCNC: 29.6 PG — SIGNIFICANT CHANGE UP (ref 27–34)
MCHC RBC-ENTMCNC: 32.7 GM/DL — SIGNIFICANT CHANGE UP (ref 32–36)
MCV RBC AUTO: 90.4 FL — SIGNIFICANT CHANGE UP (ref 80–100)
MPO AB + PR3 PNL SER: SIGNIFICANT CHANGE UP
P-ANCA SER-ACNC: NEGATIVE — SIGNIFICANT CHANGE UP
PLATELET # BLD AUTO: 209 K/UL — SIGNIFICANT CHANGE UP (ref 150–400)
POTASSIUM SERPL-MCNC: 4 MMOL/L — SIGNIFICANT CHANGE UP (ref 3.5–5.3)
POTASSIUM SERPL-SCNC: 4 MMOL/L — SIGNIFICANT CHANGE UP (ref 3.5–5.3)
RBC # BLD: 4.5 M/UL — SIGNIFICANT CHANGE UP (ref 3.8–5.2)
RBC # FLD: 12.3 % — SIGNIFICANT CHANGE UP (ref 10.3–14.5)
SODIUM SERPL-SCNC: 138 MMOL/L — SIGNIFICANT CHANGE UP (ref 135–145)
WBC # BLD: 21.18 K/UL — HIGH (ref 3.8–10.5)
WBC # FLD AUTO: 21.18 K/UL — HIGH (ref 3.8–10.5)

## 2024-07-11 PROCEDURE — 99233 SBSQ HOSP IP/OBS HIGH 50: CPT | Mod: GC

## 2024-07-11 PROCEDURE — 99232 SBSQ HOSP IP/OBS MODERATE 35: CPT

## 2024-07-11 RX ORDER — PANTOPRAZOLE SODIUM 40 MG/10ML
40 INJECTION, POWDER, FOR SOLUTION INTRAVENOUS
Refills: 0 | Status: DISCONTINUED | OUTPATIENT
Start: 2024-07-11 | End: 2024-07-12

## 2024-07-11 RX ORDER — BIOTIN/FOLIC AC/VIT BCOMP,C/ZN 3MG-0.8MG
1 TABLET ORAL DAILY
Refills: 0 | Status: DISCONTINUED | OUTPATIENT
Start: 2024-07-11 | End: 2024-07-12

## 2024-07-11 RX ORDER — BIOTIN/FOLIC AC/VIT BCOMP,C/ZN 3MG-0.8MG
1 TABLET ORAL
Qty: 30 | Refills: 0
Start: 2024-07-11 | End: 2024-08-09

## 2024-07-11 RX ADMIN — Medication 650 MILLIGRAM(S): at 01:54

## 2024-07-11 RX ADMIN — Medication 50 MILLIGRAM(S): at 05:04

## 2024-07-11 RX ADMIN — Medication 650 MILLIGRAM(S): at 00:54

## 2024-07-11 NOTE — DISCHARGE NOTE PROVIDER - HOSPITAL COURSE
48-year-old female with past medical history of ocular migraines who presented to the ED for left sided eye pain x 4 days & concerns for MS.  CT/CTA head was negative.  MRI brain with nonspecific findings.  But MRI orbit with findings suggestive of left-sided optic neuritis.  Evaluated by neurology.  Started on IV Solu-Medrol 125 mg as pulsed dose steroids x 3 days.  Now patient has clinically improved, & hemodynamically stable for discharge with plans to follow-up with neurology as outpatient.    #L eye, optic neuritis

## 2024-07-11 NOTE — DISCHARGE NOTE PROVIDER - NSDCCPCAREPLAN_GEN_ALL_CORE_FT
PRINCIPAL DISCHARGE DIAGNOSIS  Diagnosis: Left optic neuritis  Assessment and Plan of Treatment: - MRI brain w/ nonspecific findings that can be seen w/ chronic small vessel dx, demyelinating dx, sequela of chronic migraines or infectious/inflammatory process   - MRI orbits w/ findings suggestive of L-optic neuritis   - Completed 3 day course of pulse dose IV steroids  - Follow-up with Out-patient Neurology / Primary Care      SECONDARY DISCHARGE DIAGNOSES  Diagnosis: Optic neuritis, left  Assessment and Plan of Treatment:

## 2024-07-11 NOTE — DISCHARGE NOTE PROVIDER - ATTENDING DISCHARGE PHYSICAL EXAMINATION:
GENERAL: comfortable, eating her meal, states all her symptoms have mostly resolved  HEAD:  Atraumatic, Normocephalic  EYES: EOMI, PERRLA, conjunctiva and sclera clear, no visual field defect, no discharge or redness, no orbital fullness or tenderness  ENMT: Moist mucous membranes, No lesions  NECK: Supple, No JVD  NERVOUS SYSTEM:  Alert & Oriented X 3, Motor Strength 5/5 B/L upper and lower extremities  CHEST/LUNG: Clear to auscultate bilaterally; No rales, rhonchi, wheezing, or rubs  HEART: Regular rate and rhythm; No murmurs, rubs, or gallops  ABDOMEN: Soft, Nontender, Nondistended; Bowel sounds present  EXTREMITIES:  2+ Peripheral Pulses, No clubbing, cyanosis, or edema GENERAL: comfortable, eating her meal, states all her symptoms have mostly resolved  HEAD:  Atraumatic, Normocephalic  EYES: EOMI, PERRLA, conjunctiva and sclera clear, no visual field defect, no discharge or redness, no orbital fullness or tenderness  ENMT: Moist mucous membranes, No lesions  NECK: Supple, No JVD  NERVOUS SYSTEM:  Alert & Oriented X 3, Motor Strength 5/5 B/L upper and lower extremities  CHEST/LUNG: Clear to auscultate bilaterally; No rales, rhonchi, wheezing, or rubs  HEART: Regular rate and rhythm; No murmurs, rubs, or gallops  ABDOMEN: Soft, Nontender, Nondistended; Bowel sounds present  EXTREMITIES:  2+ Peripheral Pulses, No clubbing, cyanosis, or edema  ICU Vital Signs Last 24 Hrs  T(C): 37 (12 Jul 2024 08:03), Max: 37 (12 Jul 2024 08:03)  T(F): 98.6 (12 Jul 2024 08:03), Max: 98.6 (12 Jul 2024 08:03)  HR: 72 (12 Jul 2024 08:03) (72 - 90)  BP: 123/81 (12 Jul 2024 08:03) (105/68 - 123/81)  BP(mean): --  ABP: --  ABP(mean): --  RR: 17 (12 Jul 2024 08:03) (17 - 18)  SpO2: 98% (12 Jul 2024 08:03) (96% - 98%)    O2 Parameters below as of 12 Jul 2024 08:03  Patient On (Oxygen Delivery Method): room air

## 2024-07-11 NOTE — DISCHARGE NOTE PROVIDER - CARE PROVIDER_API CALL
LOC GEORGE, GEOFF Cox E NICHOLAS ELLIOTT  [place] [address]  Phone: [phone]  Fax: [phone]  Follow Up Time:

## 2024-07-12 ENCOUNTER — TRANSCRIPTION ENCOUNTER (OUTPATIENT)
Age: 49
End: 2024-07-12

## 2024-07-12 VITALS
DIASTOLIC BLOOD PRESSURE: 76 MMHG | TEMPERATURE: 99 F | OXYGEN SATURATION: 97 % | RESPIRATION RATE: 17 BRPM | SYSTOLIC BLOOD PRESSURE: 126 MMHG | HEART RATE: 73 BPM

## 2024-07-12 PROBLEM — Z78.9 OTHER SPECIFIED HEALTH STATUS: Chronic | Status: ACTIVE | Noted: 2024-07-10

## 2024-07-12 PROCEDURE — 86140 C-REACTIVE PROTEIN: CPT

## 2024-07-12 PROCEDURE — 70496 CT ANGIOGRAPHY HEAD: CPT | Mod: MC

## 2024-07-12 PROCEDURE — 86403 PARTICLE AGGLUT ANTBDY SCRN: CPT

## 2024-07-12 PROCEDURE — 85027 COMPLETE CBC AUTOMATED: CPT

## 2024-07-12 PROCEDURE — 70450 CT HEAD/BRAIN W/O DYE: CPT | Mod: MC

## 2024-07-12 PROCEDURE — 86780 TREPONEMA PALLIDUM: CPT

## 2024-07-12 PROCEDURE — 99239 HOSP IP/OBS DSCHRG MGMT >30: CPT | Mod: GC

## 2024-07-12 PROCEDURE — 99232 SBSQ HOSP IP/OBS MODERATE 35: CPT

## 2024-07-12 PROCEDURE — 36415 COLL VENOUS BLD VENIPUNCTURE: CPT

## 2024-07-12 PROCEDURE — 87070 CULTURE OTHR SPECIMN AEROBIC: CPT

## 2024-07-12 PROCEDURE — 84443 ASSAY THYROID STIM HORMONE: CPT

## 2024-07-12 PROCEDURE — 83873 ASSAY OF CSF PROTEIN: CPT

## 2024-07-12 PROCEDURE — 84436 ASSAY OF TOTAL THYROXINE: CPT

## 2024-07-12 PROCEDURE — 82947 ASSAY GLUCOSE BLOOD QUANT: CPT

## 2024-07-12 PROCEDURE — 84702 CHORIONIC GONADOTROPIN TEST: CPT

## 2024-07-12 PROCEDURE — 99285 EMERGENCY DEPT VISIT HI MDM: CPT

## 2024-07-12 PROCEDURE — 84480 ASSAY TRIIODOTHYRONINE (T3): CPT

## 2024-07-12 PROCEDURE — 83916 OLIGOCLONAL BANDS: CPT

## 2024-07-12 PROCEDURE — 70498 CT ANGIOGRAPHY NECK: CPT | Mod: MC

## 2024-07-12 PROCEDURE — 86618 LYME DISEASE ANTIBODY: CPT

## 2024-07-12 PROCEDURE — 86255 FLUORESCENT ANTIBODY SCREEN: CPT

## 2024-07-12 PROCEDURE — 96374 THER/PROPH/DIAG INJ IV PUSH: CPT

## 2024-07-12 PROCEDURE — 80053 COMPREHEN METABOLIC PANEL: CPT

## 2024-07-12 PROCEDURE — 87483 CNS DNA AMP PROBE TYPE 12-25: CPT

## 2024-07-12 PROCEDURE — 70543 MRI ORBT/FAC/NCK W/O &W/DYE: CPT | Mod: MC

## 2024-07-12 PROCEDURE — 87205 SMEAR GRAM STAIN: CPT

## 2024-07-12 PROCEDURE — 84157 ASSAY OF PROTEIN OTHER: CPT

## 2024-07-12 PROCEDURE — 85652 RBC SED RATE AUTOMATED: CPT

## 2024-07-12 PROCEDURE — 82945 GLUCOSE OTHER FLUID: CPT

## 2024-07-12 PROCEDURE — G0378: CPT

## 2024-07-12 PROCEDURE — 89051 BODY FLUID CELL COUNT: CPT

## 2024-07-12 PROCEDURE — 86341 ISLET CELL ANTIBODY: CPT

## 2024-07-12 PROCEDURE — 86036 ANCA SCREEN EACH ANTIBODY: CPT

## 2024-07-12 PROCEDURE — 80048 BASIC METABOLIC PNL TOTAL CA: CPT

## 2024-07-12 PROCEDURE — 82164 ANGIOTENSIN I ENZYME TEST: CPT

## 2024-07-12 PROCEDURE — 70553 MRI BRAIN STEM W/O & W/DYE: CPT | Mod: MC

## 2024-07-12 PROCEDURE — 85025 COMPLETE CBC W/AUTO DIFF WBC: CPT

## 2024-07-12 PROCEDURE — 96375 TX/PRO/DX INJ NEW DRUG ADDON: CPT

## 2024-07-12 RX ADMIN — Medication 650 MILLIGRAM(S): at 12:55

## 2024-07-12 RX ADMIN — Medication 50 MILLIGRAM(S): at 05:03

## 2024-07-12 RX ADMIN — Medication 650 MILLIGRAM(S): at 13:55

## 2024-07-12 NOTE — PROGRESS NOTE ADULT - SUBJECTIVE AND OBJECTIVE BOX
[facility]                                        Neurology at [place]                                 Jenn Barrow & Benjamin                                  [address]                                        [place], [place], [address]                                             [phone]        CC: Optic neuritis    HPI:   The patient is a 48y Female who presents from her eye doctor's office. She has had left eye pain for the past several days. She ultimately went for evaluation and there was concern for optic neuritis and she was referred to the ER.     Interim history:  Now on 5 [place].   No new symptoms.    ROS:   Denies headache or dizziness.  Denies chest pain.  Denies shortness of breath.    MEDICATIONS  (STANDING):  multivitamin/minerals 1 Tablet(s) Oral daily  pantoprazole    Tablet 40 milliGRAM(s) Oral before breakfast    Vital Signs Last 24 Hrs  T(C): 37 (12 Jul 2024 08:03), Max: 37 (12 Jul 2024 08:03)  T(F): 98.6 (12 Jul 2024 08:03), Max: 98.6 (12 Jul 2024 08:03)  HR: 72 (12 Jul 2024 08:03) (72 - 90)  BP: 123/81 (12 Jul 2024 08:03) (105/68 - 123/81)  RR: 17 (12 Jul 2024 08:03) (17 - 18)  SpO2: 98% (12 Jul 2024 08:03) (96% - 98%)    Parameters below as of 12 Jul 2024 08:03  Patient On (Oxygen Delivery Method): room air    Detailed Neurologic Exam:    Mental status: The patient is awake and alert. There is no aphasia. There is no dysarthria.     Cranial nerves: Pupils equal and react symmetrically to light. There is no visual field deficit to threat. Extraocular motion is full with no nystagmus. Facial sensation is intact. Facial musculature is symmetric. Palate elevates symmetrically. Tongue is midline.    Motor: There is normal bulk and tone.  There is no tremor.  Strength grossly 5/5 bilaterally.    Sensation: Grossly intact to light touch and pin.    Reflexes: 2+ throughout and plantar responses are flexor.    Cerebellar: No dysmetria on finger nose testing.    Labs:     07-11    138  |  103  |  18.0  ----------------------------<  156<H>  4.0   |  20.0<L>  |  0.58    Ca    8.9      11 Jul 2024 05:18                              13.3   21.18 )-----------( 209      ( 11 Jul 2024 05:18 )             40.7     CSF  WBC: 1  RBC: 0  Protein: 27  Glucose: 63  Gram stain: negative   Cryptococcal Antigen: negative   PCR panel: None detected.     Rad:   MRI brain/orbits:   There is mild enhancement with faint corresponding increase in T2 signal of the intraorbital segment of the left optic nerve.  There are a few nonspecific  foci of increased T2/FLAIR signal throughout the subcortical and deep white matter.      
HEALTH ISSUES - PROBLEM Dx:    CC- Neuromyelitis optica    INTERVAL HPI/ OVERNIGHT EVENTS:    denies any HA, vision field defect or vision changes  feels discomfort on left eye when looking upward and outward  and left eye feels gritty  otherwise she feels better    REVIEW OF SYSTEMS:    as above      Vital Signs Last 24 Hrs  T(C): 36.6 (11 Jul 2024 09:42), Max: 37.1 (10 Jul 2024 23:01)  T(F): 97.8 (11 Jul 2024 09:42), Max: 98.7 (10 Jul 2024 23:01)  HR: 102 (11 Jul 2024 09:42) (85 - 102)  BP: 142/94 (11 Jul 2024 09:42) (101/66 - 142/94)  BP(mean): 87 (10 Jul 2024 19:15) (86 - 87)  RR: 18 (11 Jul 2024 09:42) (18 - 18)  SpO2: 96% (11 Jul 2024 09:42) (94% - 97%)    Parameters below as of 11 Jul 2024 09:42  Patient On (Oxygen Delivery Method): room air        PHYSICAL EXAM-  GENERAL: comfortable, eating her meal, states all her symptoms have mostly resolved  HEAD:  Atraumatic, Normocephalic  EYES: EOMI, PERRLA, conjunctiva and sclera clear, no visual field defect, no discharge or redness, no orbital fullness or tenderness  ENMT: Moist mucous membranes, No lesions  NECK: Supple, No JVD, Normal thyroid  NERVOUS SYSTEM:  Alert & Oriented X 3, Motor Strength 5/5 B/L upper and lower extremities  CHEST/LUNG: Clear to auscultate bilaterally; No rales, rhonchi, wheezing, or rubs  HEART: Regular rate and rhythm; No murmurs, rubs, or gallops  ABDOMEN: Soft, Nontender, Nondistended; Bowel sounds present  EXTREMITIES:  2+ Peripheral Pulses, No clubbing, cyanosis, or edema  SKIN: No rashes or lesions    MEDICATIONS  (STANDING):  methylPREDNISolone sodium succinate IVPB 1000 milliGRAM(s) IV Intermittent daily  multivitamin/minerals 1 Tablet(s) Oral daily  pantoprazole    Tablet 40 milliGRAM(s) Oral before breakfast    MEDICATIONS  (PRN):  acetaminophen     Tablet .. 650 milliGRAM(s) Oral every 6 hours PRN Mild Pain (1 - 3)  aluminum hydroxide/magnesium hydroxide/simethicone Suspension 30 milliLiter(s) Oral every 4 hours PRN Dyspepsia  melatonin 5 milliGRAM(s) Oral at bedtime PRN Insomnia      LABS:                        13.3   21.18 )-----------( 209      ( 11 Jul 2024 05:18 )             40.7     07-11    138  |  103  |  18.0  ----------------------------<  156<H>  4.0   |  20.0<L>  |  0.58    Ca    8.9      11 Jul 2024 05:18    TPro  6.7  /  Alb  3.7  /  TBili  0.3<L>  /  DBili  x   /  AST  29  /  ALT  19  /  AlkPhos  58  07-09      Urinalysis Basic - ( 11 Jul 2024 05:18 )    Color: x / Appearance: x / SG: x / pH: x  Gluc: 156 mg/dL / Ketone: x  / Bili: x / Urobili: x   Blood: x / Protein: x / Nitrite: x   Leuk Esterase: x / RBC: x / WBC x   Sq Epi: x / Non Sq Epi: x / Bacteria: x          Culture - CSF with Gram Stain (collected 10 Jul 2024 10:50)  Source: .CSF CSF lumbar  Gram Stain (10 Jul 2024 16:43):    No polymorphonuclear cells seen    No organisms seen    by cytocentrifuge        CT scan and MRI  Radiology personally reviewed.
                            [facility]                                        Neurology at [place]                                 Jenn Barrow & Benjamin                                  [address]                                        [place], [place], [address]                                             [phone]        CC: Optic neuritis    HPI:   The patient is a 48y Female who presents from her eye doctor's office. She has had left eye pain for the past several days. She ultimately went for evaluation and there was concern for optic neuritis and she was referred to the ER.     Interim history:  Now on 5 [place].   No new symptoms.    ROS:   Denies headache or dizziness.  Denies chest pain.  Denies shortness of breath.    MEDICATIONS  (STANDING):  methylPREDNISolone sodium succinate IVPB 1000 milliGRAM(s) IV Intermittent daily    Vital Signs Last 24 Hrs  T(C): 36.6 (11 Jul 2024 09:42), Max: 37.1 (10 Jul 2024 11:00)  T(F): 97.8 (11 Jul 2024 09:42), Max: 98.7 (10 Jul 2024 11:00)  HR: 102 (11 Jul 2024 09:42) (78 - 102)  BP: 142/94 (11 Jul 2024 09:42) (101/66 - 142/94)  BP(mean): 87 (10 Jul 2024 19:15) (86 - 87)  RR: 18 (11 Jul 2024 09:42) (16 - 18)  SpO2: 96% (11 Jul 2024 09:42) (94% - 98%)    Parameters below as of 11 Jul 2024 09:42  Patient On (Oxygen Delivery Method): room air    Detailed Neurologic Exam:    Mental status: The patient is awake and alert. There is no aphasia. There is no dysarthria.     Cranial nerves: Pupils equal and react symmetrically to light. There is no visual field deficit to threat. Extraocular motion is full with no nystagmus. Facial sensation is intact. Facial musculature is symmetric. Palate elevates symmetrically. Tongue is midline.    Motor: There is normal bulk and tone.  There is no tremor.  Strength grossly 5/5 bilaterally.    Sensation: Grossly intact to light touch and pin.    Reflexes: 2+ throughout and plantar responses are flexor.    Cerebellar: No dysmetria on finger nose testing.    Labs:     07-11    138  |  103  |  18.0  ----------------------------<  156<H>  4.0   |  20.0<L>  |  0.58    Ca    8.9      11 Jul 2024 05:18    TPro  6.7  /  Alb  3.7  /  TBili  0.3<L>  /  DBili  x   /  AST  29  /  ALT  19  /  AlkPhos  58  07-09                            13.3   21.18 )-----------( 209      ( 11 Jul 2024 05:18 )             40.7     CSF  WBC: 1  RBC: 0  Protein: 27  Glucose: 63  Gram stain: negative   Cryptococcal Antigen: negative   PCR panel: None detected.     Rad:   MRI brain/orbits:   There is mild enhancement with faint corresponding increase in T2 signal of the intraorbital segment of the left optic nerve.  There are a few nonspecific  foci of increased T2/FLAIR signal throughout the subcortical and deep white matter.

## 2024-07-12 NOTE — DISCHARGE NOTE NURSING/CASE MANAGEMENT/SOCIAL WORK - NSDCPEFALRISK_GEN_ALL_CORE
For information on Fall & Injury Prevention, visit: [email or web address] OR  [email or web address] OR  https://www.cdc.gov/steadi/patient.html

## 2024-07-12 NOTE — DISCHARGE NOTE NURSING/CASE MANAGEMENT/SOCIAL WORK - PATIENT PORTAL LINK FT
You can access the FollowMyHealth Patient Portal offered by [facility] by registering at the following website: [email or web address]. By joining [employer]’s FollowMyHealth portal, you will also be able to view your health information using other applications (apps) compatible with our system.

## 2024-07-12 NOTE — PROGRESS NOTE ADULT - ASSESSMENT
48y Female with left optic neuritis.     Optic neuritis.   MRI findings consistent with optic neuritis.   Brain MRI findings are non specific and do not appear typical of multiple sclerosis.   CSF negative thus far. (Awaiting oligoclonal bands and myelin basic protein).  IV Solu medrol day 3/3 completed this am.    OK for home today.    
48y Female with left optic neuritis.     Optic neuritis.   MRI findings consistent with optic neuritis.   Brain MRI findings are non specific and do not appear typical of multiple sclerosis.   CSF negative thus far.   IV Solu medrol day 2/3.    Home tomorrow after third dose if stable.   
47 y/o F w/ PMH of occular migraines presents c/o L-eye pain x 4 days. Pt states the pain is pressure like initially and was intermittent but for the past 2 days its constant and is hurts more w/ eye movement.  Initially hurt when looking up to the left and right then progressed to pain w/ all eye movements.  She also had an associated frontal HA yesterday.  She went to ophthalmologist today and was found to have a normal fundoscopic exam, normal IOP, normal VA but was sent in to ED for concern for MS.  Pt denies vision loss or vision changes.  She has no trauma to eye.  She denies unsteady gait, incontinence dizziness, near sycnope.  ESR/CRP negative.  CTH and CTA h/n negative.  MRI brain w/ nonspecific findings.  MRI orbit w/ findings suggestive of L-optic neuritis.  Pt was given 125mg IV solumedrol.  LP performed  Neurology consulted.  Pt will be admitted for optic neuritis.        L eye Neuromyelitis optica  - MRI brain w/ nonspecific findings that can be seen w/ chronic small vessel dx, demyelinating dx, sequela of chronic migraines or infectious/inflammatory process   - MRI orbits w/ findings suggestive of L-optic neuritis   - differential- MS, CMV, Lyme, Herpes, TB, Nutritional, Vascular. All of these ruled out. Other differential is Vasculitic. Has a strong FH of vasculitis.    - ESR 5 and CRP<4 making vasculitis less likely now  - Clinically no signs of infection  - s/p 125mg IV solumedrol in ED and now on pulse dose steroids 2/3. Last dose tomorrow  - Neuro to decide if further Po steroids is needed after tomorrows dose  - s/p LP and CSF . so far that has resulted is negative. rest testing  - Neurology following and recs noted  - add GI protectant        VTE ppx: ambulatory   Dispo- Home  on Friday +/- PO steroids

## 2024-07-13 ENCOUNTER — TRANSCRIPTION ENCOUNTER (OUTPATIENT)
Age: 49
End: 2024-07-13

## 2024-07-15 LAB
CULTURE RESULTS: NO GROWTH — SIGNIFICANT CHANGE UP
MBP CSF-MCNC: 4.5 NG/ML — HIGH (ref 0–3.7)
OLIGOCLONAL BANDS CSF ELPH-IMP: PRESENT
SPECIMEN SOURCE: SIGNIFICANT CHANGE UP

## 2024-07-16 LAB — INNER EAR 68KD AB FLD QL: <1.5 U/L — SIGNIFICANT CHANGE UP (ref 0–2.5)

## 2024-07-17 LAB
AMPA-R AB CBA, CSF: NEGATIVE — SIGNIFICANT CHANGE UP
AMPHIPHYSIN AB TITR CSF: NEGATIVE — SIGNIFICANT CHANGE UP
CASPR2-IGG CBA, CSF: NEGATIVE — SIGNIFICANT CHANGE UP
CV2 IGG TITR CSF: NEGATIVE — SIGNIFICANT CHANGE UP
GABA-B-R AB CBA, CSF: NEGATIVE — SIGNIFICANT CHANGE UP
GAD65 AB CSF-SCNC: 0 NMOL/L — SIGNIFICANT CHANGE UP
GFAP IFA, CSF: NEGATIVE — SIGNIFICANT CHANGE UP
GLIAL NUC TYPE 1 AB TITR CSF: NEGATIVE — SIGNIFICANT CHANGE UP
HU1 AB TITR CSF IF: NEGATIVE — SIGNIFICANT CHANGE UP
HU2 AB TITR CSF IF: NEGATIVE — SIGNIFICANT CHANGE UP
HU3 AB TITR CSF: NEGATIVE — SIGNIFICANT CHANGE UP
IFA NOTES: SIGNIFICANT CHANGE UP
IMMUNOLOGIST REVIEW: SIGNIFICANT CHANGE UP
LGI1-IGG CBA, CSF: NEGATIVE — SIGNIFICANT CHANGE UP
MGLUR1 AB IFA, CSF: NEGATIVE — SIGNIFICANT CHANGE UP
PCA-TR AB TITR CSF: NEGATIVE — SIGNIFICANT CHANGE UP
PURKINJE CELL CYTOPLASMIC AB TYPE 2: NEGATIVE — SIGNIFICANT CHANGE UP
PURKINJE CELLS AB TITR CSF IF: NEGATIVE — SIGNIFICANT CHANGE UP

## 2024-11-04 ENCOUNTER — APPOINTMENT (OUTPATIENT)
Dept: NEUROLOGY | Facility: CLINIC | Age: 49
End: 2024-11-04

## 2024-12-12 ENCOUNTER — APPOINTMENT (OUTPATIENT)
Dept: VASCULAR SURGERY | Facility: CLINIC | Age: 49
End: 2024-12-12

## 2024-12-12 PROCEDURE — ZZZZZ: CPT

## 2024-12-28 ENCOUNTER — OFFICE (OUTPATIENT)
Dept: URBAN - METROPOLITAN AREA CLINIC 115 | Facility: CLINIC | Age: 49
Setting detail: OPHTHALMOLOGY
End: 2024-12-28
Payer: COMMERCIAL

## 2024-12-28 DIAGNOSIS — H50.10: ICD-10-CM

## 2024-12-28 DIAGNOSIS — H46.9: ICD-10-CM

## 2024-12-28 DIAGNOSIS — H16.222: ICD-10-CM

## 2024-12-28 DIAGNOSIS — H53.433: ICD-10-CM

## 2024-12-28 DIAGNOSIS — H16.221: ICD-10-CM

## 2024-12-28 PROCEDURE — 92133 CPTRZD OPH DX IMG PST SGM ON: CPT | Performed by: OPHTHALMOLOGY

## 2024-12-28 PROCEDURE — 92012 INTRM OPH EXAM EST PATIENT: CPT | Performed by: OPHTHALMOLOGY

## 2024-12-28 PROCEDURE — 92060 SENSORIMOTOR EXAMINATION: CPT | Performed by: OPHTHALMOLOGY

## 2024-12-28 PROCEDURE — 92083 EXTENDED VISUAL FIELD XM: CPT | Performed by: OPHTHALMOLOGY

## 2024-12-28 ASSESSMENT — REFRACTION_AUTOREFRACTION
OS_CYLINDER: -0.50
OD_SPHERE: +0.50
OS_SPHERE: +0.50
OD_AXIS: 026
OS_AXIS: 020
OD_CYLINDER: -1.25

## 2024-12-28 ASSESSMENT — REFRACTION_CURRENTRX
OS_CYLINDER: -1.00
OS_AXIS: 179
OS_ADD: +0.75
OS_CYLINDER: -0.75
OD_ADD: +1.00
OD_ADD: +1.25
OS_VPRISM_DIRECTION: PROGS
OS_OVR_VA: 20/
OD_OVR_VA: 20/
OD_AXIS: 023
OD_SPHERE: +0.50
OD_CYLINDER: -1.00
OS_SPHERE: +0.25
OS_VPRISM_DIRECTION: PROGS
OS_ADD: +1.25
OD_OVR_VA: 20/
OS_SPHERE: +0.25
OD_AXIS: 017
OD_VPRISM_DIRECTION: PROGS
OD_CYLINDER: -1.00
OD_SPHERE: +0.25
OD_VPRISM_DIRECTION: PROGS
OS_OVR_VA: 20/
OS_AXIS: 001

## 2024-12-28 ASSESSMENT — TONOMETRY
OS_IOP_MMHG: 14
OD_IOP_MMHG: 17

## 2024-12-28 ASSESSMENT — VISUAL ACUITY
OS_BCVA: 20/20
OD_BCVA: 20/20

## 2024-12-28 ASSESSMENT — CONFRONTATIONAL VISUAL FIELD TEST (CVF)
OD_FINDINGS: FULL
OS_FINDINGS: FULL

## 2025-03-31 ENCOUNTER — APPOINTMENT (OUTPATIENT)
Dept: VASCULAR SURGERY | Facility: CLINIC | Age: 50
End: 2025-03-31

## 2025-06-28 ENCOUNTER — OFFICE (OUTPATIENT)
Dept: URBAN - METROPOLITAN AREA CLINIC 115 | Facility: CLINIC | Age: 50
Setting detail: OPHTHALMOLOGY
End: 2025-06-28
Payer: COMMERCIAL

## 2025-06-28 DIAGNOSIS — H53.433: ICD-10-CM

## 2025-06-28 DIAGNOSIS — H16.223: ICD-10-CM

## 2025-06-28 PROCEDURE — 92012 INTRM OPH EXAM EST PATIENT: CPT | Performed by: OPHTHALMOLOGY

## 2025-06-28 PROCEDURE — 92083 EXTENDED VISUAL FIELD XM: CPT | Performed by: OPHTHALMOLOGY

## 2025-06-28 PROCEDURE — 92133 CPTRZD OPH DX IMG PST SGM ON: CPT | Performed by: OPHTHALMOLOGY

## 2025-06-28 ASSESSMENT — REFRACTION_CURRENTRX
OS_SPHERE: +0.25
OD_CYLINDER: -1.00
OS_VPRISM_DIRECTION: PROGS
OS_AXIS: 001
OS_CYLINDER: -0.75
OD_AXIS: 023
OD_ADD: +1.00
OS_SPHERE: +0.25
OS_CYLINDER: -1.00
OS_OVR_VA: 20/
OS_ADD: +1.25
OD_SPHERE: +0.50
OD_AXIS: 017
OD_SPHERE: +0.25
OD_OVR_VA: 20/
OS_VPRISM_DIRECTION: PROGS
OD_ADD: +1.25
OS_AXIS: 179
OS_ADD: +0.75
OD_VPRISM_DIRECTION: PROGS
OD_OVR_VA: 20/
OS_OVR_VA: 20/
OD_CYLINDER: -1.00
OD_VPRISM_DIRECTION: PROGS

## 2025-06-28 ASSESSMENT — CONFRONTATIONAL VISUAL FIELD TEST (CVF)
OD_FINDINGS: FULL
OS_FINDINGS: FULL

## 2025-06-28 ASSESSMENT — TONOMETRY
OS_IOP_MMHG: 15
OD_IOP_MMHG: 14

## 2025-06-28 ASSESSMENT — REFRACTION_AUTOREFRACTION
OD_CYLINDER: -0.75
OS_SPHERE: +0.50
OD_AXIS: 070
OS_AXIS: 008
OS_CYLINDER: -0.50
OD_SPHERE: +0.75

## 2025-06-28 ASSESSMENT — VISUAL ACUITY
OD_BCVA: 20/20-2
OS_BCVA: 20/20-1